# Patient Record
Sex: MALE | Race: WHITE | URBAN - METROPOLITAN AREA
[De-identification: names, ages, dates, MRNs, and addresses within clinical notes are randomized per-mention and may not be internally consistent; named-entity substitution may affect disease eponyms.]

---

## 2018-03-20 ENCOUNTER — OFFICE VISIT (OUTPATIENT)
Dept: DERMATOLOGY | Facility: CLINIC | Age: 28
End: 2018-03-20
Payer: COMMERCIAL

## 2018-03-20 DIAGNOSIS — L70.0 ACNE VULGARIS: Primary | ICD-10-CM

## 2018-03-20 RX ORDER — DOXYCYCLINE 100 MG/1
100 CAPSULE ORAL 2 TIMES DAILY
Qty: 90 CAPSULE | Refills: 1 | Status: SHIPPED | OUTPATIENT
Start: 2018-03-20

## 2018-03-20 ASSESSMENT — PAIN SCALES - GENERAL: PAINLEVEL: NO PAIN (0)

## 2018-03-20 NOTE — LETTER
3/20/2018       RE: Cas Rahman  [address]  [facility] [address]     Dear Colleague,    Thank you for referring your patient, Cas Rahman, to the [facility] DERMATOLOGY at [facility]. Please see a copy of my visit note below.    [facility] Dermatology Note      Dermatology Problem List:  1. Acne Vulgaris - doxycycline 100mg po BID, salicylic acid wash BID    CC:   Chief Complaint   Patient presents with     Acne     Cas is here today to be seen for acne.      Encounter Date: Mar 20, 2018    History of Present Illness:  Mr. Cas Rahman is a 28 year old male who presents with acne. He currently is taking no medications for it right now. He gets acne fairly consistently. He washes his face with a salicylic acid acne wash daily, and he uses persa gel as spot treatment as needed. The past few days a very large cystic lesion developed on his left jaw line. It has now gotten a lot smaller since he picked at it and it burst. He says a lot of fluid came out of it, both pus and blood. He does not feel like his acne gets worse or better with shaving. He gets periodic cystic lesions along his jaw line he says. He has some scarring there from previous lesions. He keeps his young longer because this tends to cover it up.     He gets some acne on the inner thighs he says, but this is not a major concern of his right now.     No other major medical problems aside from a pilonidal cyst on his lower back/buttock. He had it removed surgically about 3 weeks ago. He is not currently on any antibiotics.     Past Medical History:   There is no problem list on file for this patient.    History reviewed. No pertinent past medical history.  History reviewed. No pertinent surgical history.    Social History:  The patient is .    Family History:  There is no family history of skin cancer.    Medications:  No current outpatient prescriptions on file.     No Known  Allergies      Review of Systems:  -GI: The patient denies nausea, vomiting, diarrhea or abdominal pain.  -Constitutional: The patient denies fatigue, fevers, chills, unintended weight loss, and night sweats.  -Psych- no depression, anxiety, suicidal thoughts.  -Skin: As above in HPI. No additional skin concerns.    Physical exam:  Vitals: There were no vitals taken for this visit.  GEN: This is a well developed, well-nourished male in no acute distress, in a pleasant mood.    SKIN: Acne exam, which includes the face, neck, upper central chest, and upper central back was performed.  -Very few acne lesions- however there are scattered pustules within the beard area. He has one 2cm nodulocyctic inflamed lesion on the left jaw line.   -No other lesions of concern on areas examined.     Impression/Plan:  1. Acne vulgaris    Start doxycycline 100mg po BID, side effects discussed, hand out given.    Continue with OTC salicylic acid was BID    Briefly discussed isotretinoin as an option for this patient since most of his acne breakouts are larger and cystic in nature    Offered ILK to large cystic lesion on the left jaw line, but patient declined    CC Dr. Drummond on close of this encounter.  Follow-up in 3 months, earlier for new or changing lesions.       Staff Involved:  Staff Only  All risks, benefits and alternatives were discussed with patient.  Patient is in agreement and understands the assessment and plan.  All questions were answered.    Cherri Rodriguez PA-C  [facility]: Phone: [phone], Fax: [phone]

## 2018-03-20 NOTE — NURSING NOTE
Dermatology Rooming Note    Cas Rahman's goals for this visit include:   Chief Complaint   Patient presents with     Acne     Cas is here today to be seen for acne.      Corinna Sharma MA

## 2018-03-20 NOTE — PROGRESS NOTES
[facility] Dermatology Note      Dermatology Problem List:  1. Acne Vulgaris - doxycycline 100mg po BID, salicylic acid wash BID    CC:   Chief Complaint   Patient presents with     Acne     Cas is here today to be seen for acne.      Encounter Date: Mar 20, 2018    History of Present Illness:  Mr. Cas Rahman is a 28 year old male who presents with acne. He currently is taking no medications for it right now. He gets acne fairly consistently. He washes his face with a salicylic acid acne wash daily, and he uses persa gel as spot treatment as needed. The past few days a very large cystic lesion developed on his left jaw line. It has now gotten a lot smaller since he picked at it and it burst. He says a lot of fluid came out of it, both pus and blood. He does not feel like his acne gets worse or better with shaving. He gets periodic cystic lesions along his jaw line he says. He has some scarring there from previous lesions. He keeps his young longer because this tends to cover it up.     He gets some acne on the inner thighs he says, but this is not a major concern of his right now.     No other major medical problems aside from a pilonidal cyst on his lower back/buttock. He had it removed surgically about 3 weeks ago. He is not currently on any antibiotics.     Past Medical History:   There is no problem list on file for this patient.    History reviewed. No pertinent past medical history.  History reviewed. No pertinent surgical history.    Social History:  The patient is .    Family History:  There is no family history of skin cancer.    Medications:  No current outpatient prescriptions on file.     No Known Allergies      Review of Systems:  -GI: The patient denies nausea, vomiting, diarrhea or abdominal pain.  -Constitutional: The patient denies fatigue, fevers, chills, unintended weight loss, and night sweats.  -Psych- no depression, anxiety, suicidal thoughts.  -Skin: As above  in HPI. No additional skin concerns.    Physical exam:  Vitals: There were no vitals taken for this visit.  GEN: This is a well developed, well-nourished male in no acute distress, in a pleasant mood.    SKIN: Acne exam, which includes the face, neck, upper central chest, and upper central back was performed.  -Very few acne lesions- however there are scattered pustules within the beard area. He has one 2cm nodulocyctic inflamed lesion on the left jaw line.   -No other lesions of concern on areas examined.     Impression/Plan:  1. Acne vulgaris    Start doxycycline 100mg po BID, side effects discussed, hand out given.    Continue with OTC salicylic acid was BID    Briefly discussed isotretinoin as an option for this patient since most of his acne breakouts are larger and cystic in nature    Offered ILK to large cystic lesion on the left jaw line, but patient declined    CC Dr. Drummond on close of this encounter.  Follow-up in 3 months, earlier for new or changing lesions.       Staff Involved:  Staff Only  All risks, benefits and alternatives were discussed with patient.  Patient is in agreement and understands the assessment and plan.  All questions were answered.    Cherri Rodriguez PA-C  [facility]: Phone: [phone], Fax: [phone]

## 2018-03-20 NOTE — PATIENT INSTRUCTIONS
Doxycycline     1.Doxycycline treats and prevents infections and may be used to treat acne.   2.Do not use it if you had an allergic reaction to doxycycline or another tetracycline antibiotic, or if you are pregnant or breastfeeding.  3. If you miss a dose, take a dose as soon as you remember. If it is almost time for your next dose, wait until then and take a regular dose. Do not take extra medicine to make up for a missed dose.   4 . Some foods and medicines can affect the medication. Tell your doctor if you are using any of the following: bismuth subsalicylate, isotretinoin, acitretin, medications that contain aluminum, calcium, or iron (such an antacid or vitamin supplement)  5. This medicine may cause birth defects if being used during pregnancy.  Use two forms of birth control to keep from getting pregnant.   6. Tell your doctor if you had stomach surgery or if you have a history of yeast infections.   7. This medicine may cause the following problems (stop the medication if you experience these symptoms and call your doctor):  Permanent change in tooth color (in children younger than 8 years old)   Increased pressure inside the head   Yeast infection   Diarrhea    This medicine may make your skin more sun sensitive and increase sun burns. Wear sunscreen and do not tan.     Allergic reaction with itching, hives, facial swelling, throat swelling, chest tightness, trouble breathing     Blistering, peeling, red skin rash     Burning, pain, or irritation in your upper stomach or throat     Joint pain, fever, rash, and unusual tiredness or weakness     Severe headache, dizziness, or vision changes  8. Call your doctor if your symptoms worsen or do not improve  Who should I call with questions?    [facility]: [phone]     [facility]: [phone]    For urgent needs outside of business hours call the [facility] at [phone] and ask for the  dermatology resident on call

## 2018-03-20 NOTE — MR AVS SNAPSHOT
After Visit Summary   3/20/2018    Cas Rahman    MRN: [id number]           Patient Information     Date Of Birth          1990        Visit Information        Provider Department      3/20/2018 12:00 PM Cherri Rodriguez PA-C [facility] Dermatology        Today's Diagnoses     Acne vulgaris    -  1      Care Instructions    Doxycycline     1.Doxycycline treats and prevents infections and may be used to treat acne.   2.Do not use it if you had an allergic reaction to doxycycline or another tetracycline antibiotic, or if you are pregnant or breastfeeding.  3. If you miss a dose, take a dose as soon as you remember. If it is almost time for your next dose, wait until then and take a regular dose. Do not take extra medicine to make up for a missed dose.   4 . Some foods and medicines can affect the medication. Tell your doctor if you are using any of the following: bismuth subsalicylate, isotretinoin, acitretin, medications that contain aluminum, calcium, or iron (such an antacid or vitamin supplement)  5. This medicine may cause birth defects if being used during pregnancy.  Use two forms of birth control to keep from getting pregnant.   6. Tell your doctor if you had stomach surgery or if you have a history of yeast infections.   7. This medicine may cause the following problems (stop the medication if you experience these symptoms and call your doctor):  Permanent change in tooth color (in children younger than 8 years old)   Increased pressure inside the head   Yeast infection   Diarrhea    This medicine may make your skin more sun sensitive and increase sun burns. Wear sunscreen and do not tan.     Allergic reaction with itching, hives, facial swelling, throat swelling, chest tightness, trouble breathing     Blistering, peeling, red skin rash     Burning, pain, or irritation in your upper stomach or throat     Joint pain, fever, rash, and unusual tiredness or weakness     Severe headache,  dizziness, or vision changes  8. Call your doctor if your symptoms worsen or do not improve  Who should I call with questions?    [facility]: [phone]     [facility]: [phone]    For urgent needs outside of business hours call the [facility] at [phone] and ask for the dermatology resident on call                    Follow-ups after your visit        Follow-up notes from your care team     Return in about 3 months (around 2018).      Who to contact     Please call your clinic at [phone] to:    Ask questions about your health    Make or cancel appointments    Discuss your medicines    Learn about your test results    Speak to your doctor            Additional Information About Your Visit        [employer] Information     [employer] is an electronic gateway that provides easy, online access to your medical records. With [employer], you can request a clinic appointment, read your test results, renew a prescription or communicate with your care team.     To sign up for [employer] visit the website at [email or web address]   You will be asked to enter the access code listed below, as well as some personal information. Please follow the directions to create your username and password.     Your access code is: [email or web address]  Expires: 2018  6:30 AM     Your access code will  in 90 days. If you need help or a new code, please contact your [facility] or call [phone] for assistance.        Care EveryWhere ID     This is your Care EveryWhere ID. This could be used by other organizations to access your [place] medical records  [id number]         Blood Pressure from Last 3 Encounters:   No data found for BP    Weight from Last 3 Encounters:   No data found for Wt              Today, you had the following     No orders found for display         Today's Medication Changes          These changes are  accurate as of 3/20/18 12:11 PM.  If you have any questions, ask your nurse or doctor.               Start taking these medicines.        Dose/Directions    doxycycline monohydrate 100 MG capsule   Used for:  Acne vulgaris   Started by:  Cherri Rodriguez PA-C        Dose:  100 mg   Take 1 capsule (100 mg) by mouth 2 times daily   Quantity:  90 capsule   Refills:  1            Where to get your medicines      [facility] [address] [place] [address] AT [address] [address]     Phone:  [phone]     doxycycline monohydrate 100 MG capsule                Primary Care Provider Fax #    Physician No Ref-Primary [phone]       No address on file        Equal Access to Services     JULIO TAVARES : Macario gayo Sopower, waaxda luqadaha, qaybta kaalmada adeallieyagurdeep, schuyler rapp . So [facility] [phone].    ATENCIÓN: Si habla español, tiene a conner disposición servicios gratuitos de asistencia lingüística. Llame al [phone].    We comply with applicable federal civil rights laws and [place] laws. We do not discriminate on the basis of race, color, national origin, age, disability, sex, sexual orientation, or gender identity.            Thank you!     Thank you for choosing [facility] DERMATOLOGY  for your care. Our goal is always to provide you with excellent care. Hearing back from our patients is one way we can continue to improve our services. Please take a few minutes to complete the written survey that you may receive in the mail after your visit with us. Thank you!             Your Updated Medication List - Protect others around you: Learn how to safely use, store and throw away your medicines at www.disposemymeds.org.          This list is accurate as of 3/20/18 12:11 PM.  Always use your most recent med list.                   Brand Name Dispense Instructions for use  Diagnosis    doxycycline monohydrate 100 MG capsule     90 capsule    Take 1 capsule (100 mg) by mouth 2 times daily    Acne vulgaris